# Patient Record
Sex: FEMALE | Race: WHITE | NOT HISPANIC OR LATINO
[De-identification: names, ages, dates, MRNs, and addresses within clinical notes are randomized per-mention and may not be internally consistent; named-entity substitution may affect disease eponyms.]

---

## 2017-04-20 ENCOUNTER — RESULT REVIEW (OUTPATIENT)
Age: 72
End: 2017-04-20

## 2018-01-03 PROBLEM — Z00.00 ENCOUNTER FOR PREVENTIVE HEALTH EXAMINATION: Status: ACTIVE | Noted: 2018-01-03

## 2018-11-21 ENCOUNTER — RESULT REVIEW (OUTPATIENT)
Age: 73
End: 2018-11-21

## 2021-01-31 ENCOUNTER — OUTPATIENT (OUTPATIENT)
Dept: OUTPATIENT SERVICES | Facility: HOSPITAL | Age: 76
LOS: 1 days | End: 2021-01-31
Payer: MEDICARE

## 2021-01-31 ENCOUNTER — APPOINTMENT (OUTPATIENT)
Dept: CT IMAGING | Facility: IMAGING CENTER | Age: 76
End: 2021-01-31
Payer: MEDICARE

## 2021-01-31 DIAGNOSIS — R31.0 GROSS HEMATURIA: ICD-10-CM

## 2021-01-31 PROCEDURE — 82565 ASSAY OF CREATININE: CPT

## 2021-01-31 PROCEDURE — 74178 CT ABD&PLV WO CNTR FLWD CNTR: CPT | Mod: 26,MH

## 2021-01-31 PROCEDURE — 74178 CT ABD&PLV WO CNTR FLWD CNTR: CPT

## 2021-03-27 ENCOUNTER — APPOINTMENT (OUTPATIENT)
Dept: MAMMOGRAPHY | Facility: IMAGING CENTER | Age: 76
End: 2021-03-27
Payer: MEDICARE

## 2021-03-27 ENCOUNTER — APPOINTMENT (OUTPATIENT)
Dept: ULTRASOUND IMAGING | Facility: IMAGING CENTER | Age: 76
End: 2021-03-27
Payer: MEDICARE

## 2021-03-27 ENCOUNTER — OUTPATIENT (OUTPATIENT)
Dept: OUTPATIENT SERVICES | Facility: HOSPITAL | Age: 76
LOS: 1 days | End: 2021-03-27
Payer: MEDICARE

## 2021-03-27 DIAGNOSIS — Z00.8 ENCOUNTER FOR OTHER GENERAL EXAMINATION: ICD-10-CM

## 2021-03-27 PROCEDURE — 77063 BREAST TOMOSYNTHESIS BI: CPT | Mod: 26

## 2021-03-27 PROCEDURE — 77067 SCR MAMMO BI INCL CAD: CPT | Mod: 26

## 2021-03-27 PROCEDURE — 76641 ULTRASOUND BREAST COMPLETE: CPT | Mod: 26,50

## 2021-03-27 PROCEDURE — 76641 ULTRASOUND BREAST COMPLETE: CPT

## 2021-03-27 PROCEDURE — 77067 SCR MAMMO BI INCL CAD: CPT

## 2021-03-27 PROCEDURE — 77063 BREAST TOMOSYNTHESIS BI: CPT

## 2023-02-02 ENCOUNTER — APPOINTMENT (OUTPATIENT)
Dept: ULTRASOUND IMAGING | Facility: CLINIC | Age: 78
End: 2023-02-02
Payer: MEDICARE

## 2023-02-02 ENCOUNTER — APPOINTMENT (OUTPATIENT)
Dept: MAMMOGRAPHY | Facility: CLINIC | Age: 78
End: 2023-02-02
Payer: MEDICARE

## 2023-02-02 PROCEDURE — 76641 ULTRASOUND BREAST COMPLETE: CPT | Mod: 50,GA

## 2023-02-02 PROCEDURE — 77067 SCR MAMMO BI INCL CAD: CPT

## 2023-02-02 PROCEDURE — 77063 BREAST TOMOSYNTHESIS BI: CPT

## 2024-09-02 ENCOUNTER — EMERGENCY (EMERGENCY)
Facility: HOSPITAL | Age: 79
LOS: 1 days | Discharge: HOME | End: 2024-09-02
Attending: EMERGENCY MEDICINE
Payer: MEDICARE

## 2024-09-02 VITALS
TEMPERATURE: 98 F | OXYGEN SATURATION: 97 % | HEART RATE: 88 BPM | SYSTOLIC BLOOD PRESSURE: 175 MMHG | DIASTOLIC BLOOD PRESSURE: 98 MMHG | RESPIRATION RATE: 18 BRPM | WEIGHT: 145.06 LBS | HEIGHT: 62 IN

## 2024-09-02 PROCEDURE — 99284 EMERGENCY DEPT VISIT MOD MDM: CPT | Mod: FS

## 2024-09-02 PROCEDURE — 99284 EMERGENCY DEPT VISIT MOD MDM: CPT | Mod: 25

## 2024-09-02 PROCEDURE — 72170 X-RAY EXAM OF PELVIS: CPT

## 2024-09-02 PROCEDURE — 72170 X-RAY EXAM OF PELVIS: CPT | Mod: 26

## 2024-09-02 PROCEDURE — 72100 X-RAY EXAM L-S SPINE 2/3 VWS: CPT

## 2024-09-02 PROCEDURE — 72100 X-RAY EXAM L-S SPINE 2/3 VWS: CPT | Mod: 26

## 2024-09-02 RX ORDER — DIAZEPAM 10 MG
1 TABLET ORAL
Qty: 9 | Refills: 0
Start: 2024-09-02 | End: 2024-09-04

## 2024-09-02 RX ORDER — LIDOCAINE/BENZALKONIUM/ALCOHOL
1 SOLUTION, NON-ORAL TOPICAL ONCE
Refills: 0 | Status: COMPLETED | OUTPATIENT
Start: 2024-09-02 | End: 2024-09-02

## 2024-09-02 RX ORDER — DIAZEPAM 10 MG
5 TABLET ORAL ONCE
Refills: 0 | Status: DISCONTINUED | OUTPATIENT
Start: 2024-09-02 | End: 2024-09-02

## 2024-09-02 RX ORDER — ACETAMINOPHEN 325 MG/1
975 TABLET ORAL ONCE
Refills: 0 | Status: COMPLETED | OUTPATIENT
Start: 2024-09-02 | End: 2024-09-02

## 2024-09-02 RX ADMIN — Medication 5 MILLIGRAM(S): at 14:08

## 2024-09-02 RX ADMIN — ACETAMINOPHEN 975 MILLIGRAM(S): 325 TABLET ORAL at 14:08

## 2024-09-02 RX ADMIN — Medication 1 PATCH: at 14:13

## 2024-09-02 NOTE — ED ADULT NURSE NOTE - NSFALLRISKINTERV_ED_ALL_ED

## 2024-09-02 NOTE — ED PROVIDER NOTE - PROGRESS NOTE DETAILS
patient states valium provided some improvement. has walker at home. able to ambulate with walker-assessed in ER. used walker well. patient offered admission. states does not want to stay wants to go home. will have referral team reach out to set up [facility] with [facility]. patient in agreement with plan. well appearing. stable for discharge.

## 2024-09-02 NOTE — ED PROVIDER NOTE - ATTENDING APP SHARED VISIT CONTRIBUTION OF CARE
I, Kristian Carmen MD, Emergency Medicine Attending Physician, personally saw and examined the patient and I personally made/approve the management plan and take responsibility for the patient management.    MDM: 79-year-old female with history of sciatica who presents with right buttock pain which radiates to the right lower extremity at the level of the ankle which started 4 to 5 days ago after she was laying on her side during her doctor's examination.    ROS: denies fevers, chills, weight loss, pain worse at night, urinary retention, incontinence of bowel or bladder, saddle anesthesia, lower extremity weakness or numbness, and no recent trauma or falls.    On examination, patient with elevated blood pressure otherwise stable vitals, nontoxic. Cardiac examination RRR, lungs CTAB with noted W/R/R.  ABD: Abdomen soft, non-tender and non-distended, no rebound, no guarding, no rigidity. No CVA tenderness.  L-SPINE: There is no erythema, deformity, swelling. Palpation without midline spinal tenderness or step off.  L2-S2 with normal 5/5 motor strength and normal sensation.,  Hyperreflexia, clonus no hyperreflexia.  (+) Tenderness over the right gluteus/piriformis with reproducing of symptoms.  Neurovascularly intact in all 4 extremities with 5/5 strength, normal sensation, equal pulses and brisk capillary refill, soft compartments.  NEURO: AAOx3, Cranial nerves III-XII intact. Strength intact with 5/5 strength in all 4 extremities. Sensation intact to light touch in all 4 extremities. No pronator drift. Normal speech and gait.    Patient with atraumatic back pain with no red flags or neuro deficits. Differential includes but is not limited to lumbar radiculopathy, spondylosis, disc herniation, degenerative disc disease.  However, not consistent with presentation for epidural hematoma, or infectious etiology including epidural abscess, osteomyelitis/discitis, and no evidence of cauda equina or cord compression.  Will obtain x-ray imaging, but low utility for advanced imaging in the ED. Will provide pain control and reassess.    X-ray imaging showed no acute fracture or dislocation.    At 1455, patient was reassessed and reports improved symptoms. Repeat examination shows no midline spinal tenderness to palpation, improved ROM.  L2-S2 motor and sensory examination normal. Patient able to go from laying to sitting to standing position and ambulate without ataxia or assistance.  Stable for discharge with close follow-up and strict return precautions. Discussed the indications and side-effects of applicable medications.  Informed patient of all diagnostic test results including imaging. The patient has been informed of all concerning signs and symptoms to return to Emergency Department, the necessity to follow up with PMD within 1 to 2 days and spine specialist within 3 to 4 days was explained, or to return to the ED if unable to follow-up appropriately, and the patient reports understanding of above with capacity and insight. I, Kristian Carmen MD, Emergency Medicine Attending Physician, personally saw and examined the patient and I personally made/approve the management plan and take responsibility for the patient management.    MDM: 79-year-old female with history of sciatica who presents with right buttock pain which radiates to the right lower extremity at the level of the ankle which started 4 to 5 days ago after she was laying on her side during her doctor's examination.    ROS: denies fevers, chills, weight loss, pain worse at night, urinary retention, incontinence of bowel or bladder, saddle anesthesia, lower extremity weakness or numbness, and no recent trauma or falls.    On examination, patient with elevated blood pressure otherwise stable vitals, nontoxic. Cardiac examination RRR, lungs CTAB with noted W/R/R.  ABD: Abdomen soft, non-tender and non-distended, no rebound, no guarding, no rigidity. No CVA tenderness.  L-SPINE: There is no erythema, deformity, swelling. Palpation without midline spinal tenderness or step off.  L2-S2 with normal 5/5 motor strength and normal sensation.,  Hyperreflexia, clonus no hyperreflexia.  (+) Tenderness over the right gluteus/piriformis with reproducing of symptoms.  Neurovascularly intact in all 4 extremities with 5/5 strength, normal sensation, equal pulses and brisk capillary refill, soft compartments.  NEURO: AAOx3, Cranial nerves III-XII intact. Strength intact with 5/5 strength in all 4 extremities. Sensation intact to light touch in all 4 extremities. No pronator drift. Normal speech and gait.    Patient with atraumatic back pain with no red flags or neuro deficits. Differential includes but is not limited to lumbar radiculopathy, spondylosis, disc herniation, degenerative disc disease.  However, not consistent with presentation for epidural hematoma, or infectious etiology including epidural abscess, osteomyelitis/discitis, and no evidence of cauda equina or cord compression.  Will obtain x-ray imaging, but low utility for advanced imaging in the ED. Will provide pain control and reassess.    X-ray imaging showed no acute fracture or dislocation.    At 1455, patient was reassessed and reports improved symptoms. However patient still with difficulty walking due to pain, thus was offered admission to hospital for pain control, PT, and further evaluation of symptoms, however patient declined and requesting to go home.  She demonstrated safe ambulation with walker. Repeat examination shows no midline spinal tenderness to palpation, improved ROM.  L2-S2 motor and sensory examination normal. Patient able to go from laying to sitting to standing position and ambulate safely with assistance of walker.     Stable for discharge with close follow-up and strict return precautions. Discussed the indications and side-effects of applicable medications.  Informed patient of all diagnostic test results including imaging. The patient has been informed of all concerning signs and symptoms to return to Emergency Department, the necessity to follow up with PMD within 1 to 2 days and spine specialist within 2-3 days was explained, or to return to the ED if unable to follow-up appropriately, and the patient reports understanding of above with capacity and insight.

## 2024-09-02 NOTE — ED PROVIDER NOTE - PHYSICAL EXAMINATION
MSK: no ttp to RLE or right buttock. no spinal tenderness. no swelling or erythema noted.   +straight leg raise.   LLE winl. normal ROM without difficulty.

## 2024-09-02 NOTE — ED PROVIDER NOTE - PATIENT PORTAL LINK FT
You can access the FollowMyHealth Patient Portal offered by [facility] by registering at the following website: [email or web address]. By joining [employer]’s FollowMyHealth portal, you will also be able to view your health information using other applications (apps) compatible with our system.

## 2024-09-02 NOTE — ED PROVIDER NOTE - NSFOLLOWUPINSTRUCTIONS_ED_ALL_ED_FT
1. It is important to follow up with your primary care doctor in 1-2 days    follow up with our  spine center. someone from our referral team will reach out to help you set up an appointment     2. bring a copy of all your results to your follow up appointments    3. you can take Tylenol as needed for pain. you can take 650mg of Tylenol every 6 hours as needed for pain. do not take more than 4000mg in a 24 hour period.     4.  medication from the pharmacy and take as instructed     5. if your symptoms worsen, persist, or if any new symptoms develop, or if you experience any signs of distress, return to the ER right away.

## 2024-09-02 NOTE — ED ADULT NURSE NOTE - OBJECTIVE STATEMENT
Pt presents to the ED A&Ox4 complaining of RLE pain.Hx sciatica. Pt states that she was at her PCP for physical exam when she experienced R glute pain r/t RLE. Pt denies falls, trauma, nvd, CP, SOB, HA, dizziness, abdominal pain, urinary symptoms, numbness, tingling. Pt usually ambulatory with walker. Pt presents to the ED A&Ox4 complaining of RLE pain x 5 days that worsened today.Hx sciatica. Pt states that she was at her PCP for physical exam when she experienced R glute pain r/t RLE. Pt denies falls, trauma, nvd, CP, SOB, HA, dizziness, abdominal pain, urinary symptoms, numbness, tingling. Pt usually ambulatory with walker.

## 2024-09-02 NOTE — ED ADULT NURSE NOTE - ED STAT RN HANDOFF DETAILS
Report endorsed to onctio Hays RN. Safety checks completed this shift. Safety rounds completed hourly.  IV sites checked Q2+remains WDL. Medications administered as ordered with no signs/symptoms of adverse reactions. Fall & skin precautions in place. Any issues endorsed to oncoming RN for follow up.

## 2024-09-02 NOTE — ED PROVIDER NOTE - OBJECTIVE STATEMENT
80 y/o female, hx of sciatica in the past, presents to the ER for RLE pain. states a few days prior was at her doctor for routine exam, was laying on her left side for a while. states after the exam was having pain to right buttock that was radiating down RLE. denies fall or trauma. denies f/n/v/d, CP, SOB, HA, dizziness, abdominal pain, urinary symptoms, numbness, tingling. states has been having to use a walker to get around.

## 2024-09-09 ENCOUNTER — APPOINTMENT (OUTPATIENT)
Dept: ORTHOPEDIC SURGERY | Facility: CLINIC | Age: 79
End: 2024-09-09

## 2025-08-05 ENCOUNTER — APPOINTMENT (OUTPATIENT)
Dept: MRI IMAGING | Facility: IMAGING CENTER | Age: 80
End: 2025-08-05
Payer: MEDICARE

## 2025-08-05 ENCOUNTER — OUTPATIENT (OUTPATIENT)
Dept: OUTPATIENT SERVICES | Facility: HOSPITAL | Age: 80
LOS: 1 days | End: 2025-08-05
Payer: MEDICARE

## 2025-08-05 DIAGNOSIS — R41.3 OTHER AMNESIA: ICD-10-CM

## 2025-08-05 PROCEDURE — 70551 MRI BRAIN STEM W/O DYE: CPT | Mod: MH

## 2025-08-05 PROCEDURE — 70551 MRI BRAIN STEM W/O DYE: CPT | Mod: 26
